# Patient Record
Sex: FEMALE | Race: WHITE
[De-identification: names, ages, dates, MRNs, and addresses within clinical notes are randomized per-mention and may not be internally consistent; named-entity substitution may affect disease eponyms.]

---

## 2018-11-08 ENCOUNTER — HOSPITAL ENCOUNTER (OUTPATIENT)
Dept: HOSPITAL 39 - MRI | Age: 48
End: 2018-11-08
Payer: COMMERCIAL

## 2018-11-08 DIAGNOSIS — I67.1: Primary | ICD-10-CM

## 2018-11-08 NOTE — MRI
EXAM DESCRIPTION:



MRA Head and/or Neck



CLINICAL HISTORY:



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



COMPARISON:



None.



TECHNIQUE:



Noncontrast time-of-flight MRA images of the head are obtained

with 3-D reconstructed images of the intracranial arterial

vasculature.



FINDINGS:



There is normal positioning of the intracranial internal carotid

arteries. No flow gap or flow-limiting stenosis is seen.



There is normal branching of the major central intracranial

arterial vasculature.



There is a tiny 3 mm probable aneurysm versus tortuous vessel in

the right MCA trifurcation region. Otherwise no aneurysm or

vascular malformation is seen.. No high-grade or flow limiting

stricture or stenosis. The left vertebral artery is dominant.



IMPRESSION:



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

looped vessel. Some of the images show a lucency in the central

aspect of the area of opacification and the  appearance of

tortuous vessel rather than aneurysm. Recommend correlation with

prior imaging studies. CTA imaging may be useful if clinically

indicated.



Electronically signed by:  Freddy Anderson MD  11/8/2018 4:51 PM [facility]

Workstation: [id number]